# Patient Record
Sex: FEMALE | Race: WHITE | NOT HISPANIC OR LATINO | Employment: OTHER | URBAN - METROPOLITAN AREA
[De-identification: names, ages, dates, MRNs, and addresses within clinical notes are randomized per-mention and may not be internally consistent; named-entity substitution may affect disease eponyms.]

---

## 2022-12-17 ENCOUNTER — HOSPITAL ENCOUNTER (EMERGENCY)
Facility: HOSPITAL | Age: 62
Discharge: HOME | End: 2022-12-17
Attending: EMERGENCY MEDICINE | Admitting: EMERGENCY MEDICINE

## 2022-12-17 VITALS
DIASTOLIC BLOOD PRESSURE: 82 MMHG | TEMPERATURE: 97.8 F | RESPIRATION RATE: 16 BRPM | SYSTOLIC BLOOD PRESSURE: 148 MMHG | WEIGHT: 230 LBS | OXYGEN SATURATION: 100 % | HEART RATE: 68 BPM | HEIGHT: 63 IN | BODY MASS INDEX: 40.75 KG/M2

## 2022-12-17 DIAGNOSIS — E16.2 HYPOGLYCEMIA: Primary | ICD-10-CM

## 2022-12-17 LAB
ANION GAP SERPL CALCULATED.3IONS-SCNC: 14 MMOL/L (ref 5–15)
BACTERIA UR QL AUTO: ABNORMAL /HPF
BASOPHILS # BLD AUTO: 0 10*3/MM3 (ref 0–0.2)
BASOPHILS NFR BLD AUTO: 0.3 % (ref 0–1.5)
BILIRUB UR QL STRIP: NEGATIVE
BUN SERPL-MCNC: 18 MG/DL (ref 8–23)
BUN/CREAT SERPL: 27.7 (ref 7–25)
CALCIUM SPEC-SCNC: 9.3 MG/DL (ref 8.6–10.5)
CHLORIDE SERPL-SCNC: 101 MMOL/L (ref 98–107)
CLARITY UR: ABNORMAL
CO2 SERPL-SCNC: 23 MMOL/L (ref 22–29)
COLOR UR: YELLOW
CREAT SERPL-MCNC: 0.65 MG/DL (ref 0.57–1)
DEPRECATED RDW RBC AUTO: 44.2 FL (ref 37–54)
EGFRCR SERPLBLD CKD-EPI 2021: 98.5 ML/MIN/1.73
EOSINOPHIL # BLD AUTO: 0 10*3/MM3 (ref 0–0.4)
EOSINOPHIL NFR BLD AUTO: 0.1 % (ref 0.3–6.2)
ERYTHROCYTE [DISTWIDTH] IN BLOOD BY AUTOMATED COUNT: 15.1 % (ref 12.3–15.4)
GLUCOSE BLDC GLUCOMTR-MCNC: 134 MG/DL (ref 70–105)
GLUCOSE BLDC GLUCOMTR-MCNC: 202 MG/DL (ref 70–105)
GLUCOSE SERPL-MCNC: 168 MG/DL (ref 65–99)
GLUCOSE UR STRIP-MCNC: ABNORMAL MG/DL
HCT VFR BLD AUTO: 38.1 % (ref 34–46.6)
HGB BLD-MCNC: 11.7 G/DL (ref 12–15.9)
HGB UR QL STRIP.AUTO: ABNORMAL
HYALINE CASTS UR QL AUTO: ABNORMAL /LPF
KETONES UR QL STRIP: ABNORMAL
LEUKOCYTE ESTERASE UR QL STRIP.AUTO: NEGATIVE
LYMPHOCYTES # BLD AUTO: 1 10*3/MM3 (ref 0.7–3.1)
LYMPHOCYTES NFR BLD AUTO: 8.4 % (ref 19.6–45.3)
MCH RBC QN AUTO: 25.8 PG (ref 26.6–33)
MCHC RBC AUTO-ENTMCNC: 30.8 G/DL (ref 31.5–35.7)
MCV RBC AUTO: 83.7 FL (ref 79–97)
MONOCYTES # BLD AUTO: 0.2 10*3/MM3 (ref 0.1–0.9)
MONOCYTES NFR BLD AUTO: 1.8 % (ref 5–12)
NEUTROPHILS NFR BLD AUTO: 10.8 10*3/MM3 (ref 1.7–7)
NEUTROPHILS NFR BLD AUTO: 89.4 % (ref 42.7–76)
NITRITE UR QL STRIP: NEGATIVE
NRBC BLD AUTO-RTO: 0 /100 WBC (ref 0–0.2)
PH UR STRIP.AUTO: 5.5 [PH] (ref 5–8)
PLATELET # BLD AUTO: 283 10*3/MM3 (ref 140–450)
PMV BLD AUTO: 8.1 FL (ref 6–12)
POTASSIUM SERPL-SCNC: 4 MMOL/L (ref 3.5–5.2)
PROT UR QL STRIP: ABNORMAL
RBC # BLD AUTO: 4.55 10*6/MM3 (ref 3.77–5.28)
RBC # UR STRIP: ABNORMAL /HPF
REF LAB TEST METHOD: ABNORMAL
SODIUM SERPL-SCNC: 138 MMOL/L (ref 136–145)
SP GR UR STRIP: 1.03 (ref 1–1.03)
SQUAMOUS #/AREA URNS HPF: ABNORMAL /HPF
UROBILINOGEN UR QL STRIP: ABNORMAL
WBC # UR STRIP: ABNORMAL /HPF
WBC NRBC COR # BLD: 12.1 10*3/MM3 (ref 3.4–10.8)

## 2022-12-17 PROCEDURE — 80048 BASIC METABOLIC PNL TOTAL CA: CPT | Performed by: EMERGENCY MEDICINE

## 2022-12-17 PROCEDURE — 81001 URINALYSIS AUTO W/SCOPE: CPT | Performed by: EMERGENCY MEDICINE

## 2022-12-17 PROCEDURE — 81003 URINALYSIS AUTO W/O SCOPE: CPT | Performed by: EMERGENCY MEDICINE

## 2022-12-17 PROCEDURE — 99284 EMERGENCY DEPT VISIT MOD MDM: CPT

## 2022-12-17 PROCEDURE — 87086 URINE CULTURE/COLONY COUNT: CPT | Performed by: EMERGENCY MEDICINE

## 2022-12-17 PROCEDURE — 85025 COMPLETE CBC W/AUTO DIFF WBC: CPT | Performed by: EMERGENCY MEDICINE

## 2022-12-17 PROCEDURE — 82962 GLUCOSE BLOOD TEST: CPT

## 2022-12-17 PROCEDURE — 36415 COLL VENOUS BLD VENIPUNCTURE: CPT

## 2022-12-17 NOTE — DISCHARGE INSTRUCTIONS
Monitor sugar closely more frequently tonight.  Adjust your insulin accordingly.  Follow-up your doctor on Monday.  Return for reoccurrence of symptoms fever vomiting or any other new or worse problems or concerns return immediately to the ER

## 2022-12-17 NOTE — ED PROVIDER NOTES
Subjective   History of Present Illness  Chief complaint low blood sugar    History of present illness this is a 62-year-old female has been diabetic on long-acting and short acting insulin who states that she took her insulin but it bottomed her out.  She did not get to eat as she normally does.  She started to feel sweaty and weak and had some right-sided weakness EMS was called they found her sugar to be 40 they gave her half amp D50 and her symptoms went away and she improved dramatically.  She was brought in for evaluation she denies any headache chest pain fever chills sweats no recent injury or illness no recent change in medications.  States she feels completely normal now and wants to go home.  Patient states that she just missed does the amount of insulin to give her self on this fast acting insulin.  Denies any other complaints or associated symptoms.  This happened within the last couple hours symptoms are moderate to severe now resolved improved with D50 and triggered by insulin injection.  No other complaints or associated symptoms at this time no recent change in medications        Review of Systems   Constitutional: Negative for chills and fever.   HENT: Negative for congestion and sinus pressure.    Eyes: Negative for photophobia and visual disturbance.   Respiratory: Negative for chest tightness and shortness of breath.    Gastrointestinal: Negative for abdominal pain, blood in stool and vomiting.   Genitourinary: Negative for difficulty urinating and dysuria.   Musculoskeletal: Negative for back pain and neck pain.   Skin: Negative for rash and wound.   Neurological: Negative for dizziness, weakness and light-headedness.   Psychiatric/Behavioral: Negative for agitation and confusion.       No past medical history on file.  Diabetic  Allergies   Allergen Reactions   • Clindamycin/Lincomycin Hives   • Contrast Dye Hives   • Macrobid [Nitrofurantoin] Hives   • Penicillins Hives   • Sulfa Antibiotics  Hives       No past surgical history on file.    No family history on file.    Social History     Socioeconomic History   • Marital status:    Social history lives at home no alcohol or drug  Prior to Admission medications    Not on File         Patient takes long-acting and short acting insulin  Objective   Physical Exam  Constitutional 62-year-old female awake alert and she is in no acute distress at this time.  Sugar 134 patient temperature 97.8 sats 100% on room air blood pressure 127/60 heart rate 68 respirations 16.  HEENT extraocular muscles are intact pupils equal round reactive there is no sign of trauma no photophobia mouth is clear.  Neck is supple no adenopathy no JVD no bruits no meningeal signs.  Lungs clear no retractions.  Back no CVA tenderness.  Heart regular without murmur.  Abdomen soft nontender good bowel sounds no peritoneal findings or pulsatile masses.  Extremities pulses equal upper lower extremities no edema no cords no Homans' sign no evidence of DVT.  Skin is warm and dry without cellulitic changes.  Neurologic awake alert orientated x4 no facial asymmetry normal speech no drift the arms legs focal weakness she walks without ataxia no focal finding  Procedures           ED Course  ED Course as of 12/17/22 2143   Sat Dec 17, 2022   1834 CBC & Differential(!) [VERNA]      ED Course User Index  [VERNA] Jonny Goldberg MD            Results for orders placed or performed during the hospital encounter of 12/17/22   Basic Metabolic Panel    Specimen: Blood   Result Value Ref Range    Glucose 168 (H) 65 - 99 mg/dL    BUN 18 8 - 23 mg/dL    Creatinine 0.65 0.57 - 1.00 mg/dL    Sodium 138 136 - 145 mmol/L    Potassium 4.0 3.5 - 5.2 mmol/L    Chloride 101 98 - 107 mmol/L    CO2 23.0 22.0 - 29.0 mmol/L    Calcium 9.3 8.6 - 10.5 mg/dL    BUN/Creatinine Ratio 27.7 (H) 7.0 - 25.0    Anion Gap 14.0 5.0 - 15.0 mmol/L    eGFR 98.5 >60.0 mL/min/1.73   Urinalysis With Culture If Indicated - Urine, Clean  Catch    Specimen: Urine, Clean Catch   Result Value Ref Range    Color, UA Yellow Yellow, Straw    Appearance, UA Cloudy (A) Clear    pH, UA 5.5 5.0 - 8.0    Specific Gravity, UA 1.027 1.005 - 1.030    Glucose, UA >=1000 mg/dL (3+) (A) Negative    Ketones, UA 15 mg/dL (1+) (A) Negative    Bilirubin, UA Negative Negative    Blood, UA Moderate (2+) (A) Negative    Protein, UA 30 mg/dL (1+) (A) Negative    Leuk Esterase, UA Negative Negative    Nitrite, UA Negative Negative    Urobilinogen, UA 0.2 E.U./dL 0.2 - 1.0 E.U./dL   CBC Auto Differential    Specimen: Blood   Result Value Ref Range    WBC 12.10 (H) 3.40 - 10.80 10*3/mm3    RBC 4.55 3.77 - 5.28 10*6/mm3    Hemoglobin 11.7 (L) 12.0 - 15.9 g/dL    Hematocrit 38.1 34.0 - 46.6 %    MCV 83.7 79.0 - 97.0 fL    MCH 25.8 (L) 26.6 - 33.0 pg    MCHC 30.8 (L) 31.5 - 35.7 g/dL    RDW 15.1 12.3 - 15.4 %    RDW-SD 44.2 37.0 - 54.0 fl    MPV 8.1 6.0 - 12.0 fL    Platelets 283 140 - 450 10*3/mm3    Neutrophil % 89.4 (H) 42.7 - 76.0 %    Lymphocyte % 8.4 (L) 19.6 - 45.3 %    Monocyte % 1.8 (L) 5.0 - 12.0 %    Eosinophil % 0.1 (L) 0.3 - 6.2 %    Basophil % 0.3 0.0 - 1.5 %    Neutrophils, Absolute 10.80 (H) 1.70 - 7.00 10*3/mm3    Lymphocytes, Absolute 1.00 0.70 - 3.10 10*3/mm3    Monocytes, Absolute 0.20 0.10 - 0.90 10*3/mm3    Eosinophils, Absolute 0.00 0.00 - 0.40 10*3/mm3    Basophils, Absolute 0.00 0.00 - 0.20 10*3/mm3    nRBC 0.0 0.0 - 0.2 /100 WBC   Urinalysis, Microscopic Only - Urine, Clean Catch    Specimen: Urine, Clean Catch   Result Value Ref Range    RBC, UA 31-50 (A) None Seen /HPF    WBC, UA 6-12 (A) None Seen /HPF    Bacteria, UA Trace (A) None Seen /HPF    Squamous Epithelial Cells, UA 7-12 (A) None Seen, 0-2 /HPF    Hyaline Casts, UA 0-2 None Seen /LPF    Methodology Automated Microscopy    POC Glucose Once    Specimen: Blood   Result Value Ref Range    Glucose 134 (H) 70 - 105 mg/dL   POC Glucose Once    Specimen: Blood   Result Value Ref Range    Glucose 202  (H) 70 - 105 mg/dL     No radiology results for the last day  Medications - No data to display                                    MDM  Number of Diagnoses or Management Options  Hypoglycemia: new and requires workup  Diagnosis management comments: Decision making.  Patient had prehospital IVs placed on the monitor reviewed by me showed a sinus rhythm Accu-Chek was 132 chemistries unremarkable blood sugar 168 urine was negative white blood cell count was 12.  Hemoglobin was 11.7.  Patient was given a sandwich to eat.  She was observed in ER for several hours blood sugar remained stable never was hypoglycemic again.  She had blood sugar 202 on recheck.  Patient desires to go home.  Her daughter is here to pick her up.  She remained awake and alert I do not see evidence of an acute stroke there is no focal findings.  She had blood sugar 40 she improved with D50 and she feels well at this point I will see him as an obvious infection anywhere no cellulitic changes no red hot swollen joints.  He has no chest pain or shortness of breath her lungs are clear vital signs look good.  Not complete list of all possibilities that can cause hypoglycemia.  We talked about the findings.  She will monitor her sugar closely 90 to adjust her insulin accordingly.  She will follow-up with her doctor on Monday for recheck and return if she has any further problems stable otherwise unremarkable ER course.  All labs obtained reviewed by me       Amount and/or Complexity of Data Reviewed  Clinical lab tests: reviewed    Risk of Complications, Morbidity, and/or Mortality  Presenting problems: moderate  Diagnostic procedures: moderate  Management options: moderate    Patient Progress  Patient progress: stable      Final diagnoses:   Hypoglycemia       ED Disposition  ED Disposition     ED Disposition   Discharge    Condition   Stable    Comment   --             Rajat Griffin MD  [facility] [address]  [address]  [phone]    In 2 days           Medication List      No changes were made to your prescriptions during this visit.          Jonny Goldberg MD  12/17/22 2861

## 2022-12-18 LAB — BACTERIA SPEC AEROBE CULT: NORMAL

## 2024-11-27 ENCOUNTER — TELEPHONE (OUTPATIENT)
Dept: ENDOCRINOLOGY | Age: 64
End: 2024-11-27
Payer: COMMERCIAL

## 2024-11-27 NOTE — TELEPHONE ENCOUNTER
-LEFT VOICEMAIL FOR SOONER APPT 11/25  -LEFT VOICEMAIL FOR SOONER APPT 11/26  -LEFT VOICEMAIL FOR SOONER APPT 11/27     REMOVING PATIENT FROM WAIT LIST

## 2024-12-04 ENCOUNTER — OFFICE VISIT (OUTPATIENT)
Dept: ENDOCRINOLOGY | Age: 64
End: 2024-12-04
Payer: MEDICARE

## 2024-12-04 ENCOUNTER — TELEPHONE (OUTPATIENT)
Dept: ENDOCRINOLOGY | Age: 64
End: 2024-12-04

## 2024-12-04 VITALS
TEMPERATURE: 98 F | WEIGHT: 251.8 LBS | DIASTOLIC BLOOD PRESSURE: 80 MMHG | SYSTOLIC BLOOD PRESSURE: 120 MMHG | BODY MASS INDEX: 44.6 KG/M2 | HEART RATE: 54 BPM | OXYGEN SATURATION: 100 %

## 2024-12-04 DIAGNOSIS — E10.9 TYPE 1 DIABETES MELLITUS WITHOUT COMPLICATION: Primary | ICD-10-CM

## 2024-12-04 RX ORDER — ALLOPURINOL 300 MG/1
300 TABLET ORAL DAILY
COMMUNITY

## 2024-12-04 RX ORDER — ACYCLOVIR 400 MG/1
1 TABLET ORAL
COMMUNITY
Start: 2024-10-18 | End: 2024-12-04 | Stop reason: SDUPTHER

## 2024-12-04 RX ORDER — HUMAN INSULIN 100 [IU]/ML
5-15 INJECTION, SOLUTION SUBCUTANEOUS
COMMUNITY
End: 2024-12-04

## 2024-12-04 RX ORDER — ZINC GLUCONATE 50 MG
50 TABLET ORAL DAILY
COMMUNITY

## 2024-12-04 RX ORDER — GLUCAGON INJECTION, SOLUTION 1 MG/.2ML
0.2 INJECTION, SOLUTION SUBCUTANEOUS
COMMUNITY
Start: 2024-07-17

## 2024-12-04 RX ORDER — MAGNESIUM 200 MG
500 TABLET ORAL
COMMUNITY

## 2024-12-04 RX ORDER — HYDROCODONE BITARTRATE AND ACETAMINOPHEN 5; 325 MG/1; MG/1
1 TABLET ORAL EVERY 6 HOURS PRN
COMMUNITY
Start: 2024-11-27

## 2024-12-04 RX ORDER — INSULIN GLARGINE 100 [IU]/ML
40 INJECTION, SOLUTION SUBCUTANEOUS NIGHTLY
Qty: 10 ML | Refills: 3 | Status: SHIPPED | OUTPATIENT
Start: 2024-12-04

## 2024-12-04 RX ORDER — PROPRANOLOL HYDROCHLORIDE 60 MG/1
CAPSULE, EXTENDED RELEASE ORAL
COMMUNITY
Start: 2024-12-02

## 2024-12-04 RX ORDER — ATORVASTATIN CALCIUM 40 MG/1
40 TABLET, FILM COATED ORAL DAILY
COMMUNITY

## 2024-12-04 RX ORDER — HUMAN INSULIN 100 [IU]/ML
22-25 INJECTION, SUSPENSION SUBCUTANEOUS
COMMUNITY
End: 2024-12-04

## 2024-12-04 RX ORDER — CITALOPRAM HYDROBROMIDE 40 MG/1
40 TABLET ORAL DAILY
COMMUNITY

## 2024-12-04 RX ORDER — ACYCLOVIR 400 MG/1
1 TABLET ORAL
Qty: 3 EACH | Refills: 11 | Status: SHIPPED | OUTPATIENT
Start: 2024-12-04

## 2024-12-04 NOTE — PROGRESS NOTES
Referring provider: Rajat Griffin,*     Chief complaint/Reason for consult: T1DM    HPI:   - 64 year old female here for management of diabetes mellitus type 1  - Has had diabetes for years  - Complications include  - No known complications to date  - Is currently taking Novolin N 22-27 units bid and Lispro 5-15 meals before meals  - Wears a DexCom G7    The following portions of the patient's history were reviewed and updated as appropriate: allergies, current medications, past family history, past medical history, past social history, past surgical history, and problem list.    Objective     Vitals:    12/04/24 1156   BP: 120/80   Pulse: 54   Temp: 98 °F (36.7 °C)   SpO2: 100%        Physical Exam  Vitals reviewed.   Constitutional:       Appearance: Normal appearance. She is obese.   Eyes:      General: No scleral icterus.  Pulmonary:      Effort: Pulmonary effort is normal. No respiratory distress.   Neurological:      Mental Status: She is alert.      Gait: Gait normal.   Psychiatric:         Mood and Affect: Mood normal.         Behavior: Behavior normal.         Thought Content: Thought content normal.         Judgment: Judgment normal.     CGM interpretation  Dates reviewed:  11/21-12/4/24  Data:  Avg of 225, 40% very high, 22% high, 36% in range, 2% low, <1% low  Interpretation:  Hyperglycemia especially during the day    Assessment & Plan   T1DM, uncontrolled due to hyperglycemia  - Stop Novolin N  - Increase Lispro by 2 units with meals (should take 7-17 units before meals)  - Start Lantus 40 units daily    - Return to clinic in 2 months

## 2024-12-04 NOTE — TELEPHONE ENCOUNTER
Caller: Sandrita Bajwa    Relationship: Self    Best call back number:   Telephone Information:   Mobile [phone]         What is the best time to reach you: ANYTIME    Who are you requesting to speak with (clinical staff, provider,  specific staff member): CLINICAL STAFF / PROVIDER    What was the call regarding: PT CALLED STATING SHE HAD AND APPOINTMENT 12/04/2024. PT WONDERING HOW MANY UNITS SHOULD SHE TAKE FOR HER LISPRO MEDICATION. PLEASE ADVISE.

## 2024-12-04 NOTE — TELEPHONE ENCOUNTER
PATIENTS SISTER CALLING BACK. PATIENT IS VERY HANDY WITH COMPUTER AND HAVING ISSUES WITH PHONE. IF YOU COULD CALL HER SISTER BACK. BLUE [employer] [phone]

## 2024-12-05 ENCOUNTER — TELEPHONE (OUTPATIENT)
Dept: ENDOCRINOLOGY | Age: 64
End: 2024-12-05

## 2024-12-05 NOTE — TELEPHONE ENCOUNTER
Called and spoke with pt's sister, Imani. Informed Imani that pt is to increase Lispro by 2 units with meals. Imani voiced understanding and had no further questions or concerns.

## 2024-12-05 NOTE — TELEPHONE ENCOUNTER
Called and spoke with pt. Informed pt to increase Lispro by 2 units with meals. Pt voiced understanding and had no further questions or concerns.

## 2025-02-04 ENCOUNTER — OFFICE VISIT (OUTPATIENT)
Dept: ENDOCRINOLOGY | Age: 65
End: 2025-02-04
Payer: MEDICARE

## 2025-02-04 VITALS
OXYGEN SATURATION: 98 % | SYSTOLIC BLOOD PRESSURE: 124 MMHG | DIASTOLIC BLOOD PRESSURE: 80 MMHG | BODY MASS INDEX: 46.33 KG/M2 | HEIGHT: 62 IN | TEMPERATURE: 97.7 F | HEART RATE: 63 BPM | WEIGHT: 251.8 LBS

## 2025-02-04 DIAGNOSIS — E10.9 TYPE 1 DIABETES MELLITUS WITHOUT COMPLICATION: ICD-10-CM

## 2025-02-04 RX ORDER — INSULIN GLARGINE 100 [IU]/ML
40 INJECTION, SOLUTION SUBCUTANEOUS NIGHTLY
Qty: 10 ML | Refills: 3 | Status: SHIPPED | OUTPATIENT
Start: 2025-02-04

## 2025-02-04 RX ORDER — INSULIN LISPRO 100 [IU]/ML
10-20 INJECTION, SOLUTION INTRAVENOUS; SUBCUTANEOUS
Qty: 60 ML | Refills: 0 | Status: SHIPPED | OUTPATIENT
Start: 2025-02-04

## 2025-02-04 RX ORDER — LOSARTAN POTASSIUM 100 MG/1
1 TABLET ORAL DAILY
COMMUNITY
Start: 2024-12-13

## 2025-02-04 NOTE — PROGRESS NOTES
Chief complaint/Reason for consult:  T1DM    HPI:   - 64 year old female here for management of diabetes mellitus type 1  - Has had diabetes for over 10 years  - No known complications to date  - Is currently taking Lantus 40 units daily and Lispro 7-17 meals before meals  - Wears a DexCom G7    The following portions of the patient's history were reviewed and updated as appropriate: allergies, current medications, past family history, past medical history, past social history, past surgical history, and problem list.      Objective     Vitals:    02/04/25 1149   BP: 124/80   Pulse: 63   Temp: 97.7 °F (36.5 °C)   SpO2: 98%        Physical Exam  Vitals reviewed.   Constitutional:       Appearance: Normal appearance.   HENT:      Head: Normocephalic and atraumatic.   Eyes:      General: No scleral icterus.  Pulmonary:      Effort: Pulmonary effort is normal.   Neurological:      Mental Status: She is alert.      Gait: Gait normal.   Psychiatric:         Mood and Affect: Mood normal.         Behavior: Behavior normal.         Thought Content: Thought content normal.         Judgment: Judgment normal.     CGM interpretation  Dates reviewed:  1/22-2/4/25  Data:  Avg of 247, 50% very high, 27% high, 20% in range, 1% low, 2% very low  Interpretation:  Postprandial hyperglycemia      Assessment & Plan   T1DM, uncontrolled due to hyperglycemia  - Increase Lispro to 10-20 units before meals  - Start Lantus 40 units daily     - Return to clinic in 2 months

## 2025-02-07 ENCOUNTER — TELEPHONE (OUTPATIENT)
Dept: ENDOCRINOLOGY | Age: 65
End: 2025-02-07
Payer: MEDICARE

## 2025-02-07 NOTE — TELEPHONE ENCOUNTER
Called and spoke with pt. Informed pt that she should go ahead and put on last sensor. Informed pt she would need to contact [employer] to get replacement sensors sent out. Pt stated she has already contacted [employer] and they are sending her replacements out. Pt had no further questions or concerns.

## 2025-05-04 DIAGNOSIS — E10.9 TYPE 1 DIABETES MELLITUS WITHOUT COMPLICATION: ICD-10-CM

## 2025-05-05 RX ORDER — INSULIN LISPRO 100 [IU]/ML
INJECTION, SOLUTION INTRAVENOUS; SUBCUTANEOUS
Qty: 60 ML | Refills: 0 | Status: SHIPPED | OUTPATIENT
Start: 2025-05-05

## 2025-05-05 NOTE — TELEPHONE ENCOUNTER
Rx Refill Note  Requested Prescriptions     Pending Prescriptions Disp Refills    Insulin Lispro (humaLOG) 100 UNIT/ML injection [Pharmacy Med Name: Insulin Lispro Injection Solution 100 UNIT/ML] 60 mL 0     Sig: INJECT 10 TO 20 UNITS UNDER THE SKIN INTO THE APPROPRIATE AREA AS DIRECTED 3 TIMES A DAY BEFORE MEALS.      Last office visit with prescribing clinician: 2/4/2025   Last telemedicine visit with prescribing clinician: Visit date not found   Next office visit with prescribing clinician: 5/6/2025                         Would you like a call back once the refill request has been completed: [] Yes [] No    If the office needs to give you a call back, can they leave a voicemail: [] Yes [] No    Ana Gunn MA  05/05/25, 07:45 EDT

## 2025-05-06 ENCOUNTER — OFFICE VISIT (OUTPATIENT)
Dept: ENDOCRINOLOGY | Age: 65
End: 2025-05-06
Payer: MEDICARE

## 2025-05-06 VITALS
OXYGEN SATURATION: 100 % | WEIGHT: 256.6 LBS | HEIGHT: 62 IN | BODY MASS INDEX: 47.22 KG/M2 | DIASTOLIC BLOOD PRESSURE: 84 MMHG | SYSTOLIC BLOOD PRESSURE: 132 MMHG | HEART RATE: 60 BPM

## 2025-05-06 DIAGNOSIS — E10.40 TYPE 1 DIABETES MELLITUS WITH DIABETIC NEUROPATHY: Primary | ICD-10-CM

## 2025-05-06 NOTE — PROGRESS NOTES
Chief complaint/Reason for consult: T1DM    HPI:   - 64 year old female here for management of diabetes mellitus type 1  - Was last seen in 2/2025  - Has had diabetes for over 10 years  - She does have peripheral neuropathy  - Is currently taking Lantus 40 units daily and Lispro 15-20 meals before meals  - Wears a DexCom G7    The following portions of the patient's history were reviewed and updated as appropriate: allergies, current medications, past family history, past medical history, past social history, past surgical history, and problem list.      Objective     Vitals:    05/06/25 1115   BP: 132/84   Pulse: 60   SpO2: 100%        Physical Exam  Vitals reviewed.   Constitutional:       Appearance: Normal appearance.   HENT:      Head: Normocephalic and atraumatic.   Eyes:      General: No scleral icterus.  Pulmonary:      Effort: Pulmonary effort is normal. No respiratory distress.   Neurological:      Mental Status: She is alert.   Psychiatric:         Mood and Affect: Mood normal.         Behavior: Behavior normal.         Thought Content: Thought content normal.         Judgment: Judgment normal.     CGM interpretation  Dates reviewed:  4/23-5/6/25  Data:  Avg of 233, 37% very high, 30% high, 31% in range, 2% low  Interpretation:  Postprandial hyperglycemia    Assessment & Plan   T1DM, uncontrolled due to hyperglycemia, complicated by peripheral neuropathy  - She does not want to try an insulin pump  - Increase Lispro to 17-22 units before meals  - Start Lantus 40 units daily     - Return to clinic in 3 months

## 2025-06-22 DIAGNOSIS — E10.9 TYPE 1 DIABETES MELLITUS WITHOUT COMPLICATION: ICD-10-CM

## 2025-06-23 RX ORDER — INSULIN GLARGINE 100 [IU]/ML
40 INJECTION, SOLUTION SUBCUTANEOUS DAILY
Qty: 36 ML | Refills: 0 | Status: SHIPPED | OUTPATIENT
Start: 2025-06-23

## 2025-06-23 NOTE — TELEPHONE ENCOUNTER
Rx Refill Note  Requested Prescriptions     Pending Prescriptions Disp Refills    Lantus 100 UNIT/ML injection [Pharmacy Med Name: Lantus Subcutaneous Solution 100 UNIT/ML] 10 mL 0     Sig: Inject 40 Units under the skin into the appropriate area as directed Every Night.      Last office visit with prescribing clinician: 5/6/2025   Last telemedicine visit with prescribing clinician: Visit date not found   Next office visit with prescribing clinician: 8/20/2025                         Would you like a call back once the refill request has been completed: [] Yes [] No    If the office needs to give you a call back, can they leave a voicemail: [] Yes [] No  Ana Gunn MA  6/23/2025  07:51 EDT

## 2025-08-20 ENCOUNTER — OFFICE VISIT (OUTPATIENT)
Dept: ENDOCRINOLOGY | Age: 65
End: 2025-08-20
Payer: MEDICARE

## 2025-08-20 VITALS
BODY MASS INDEX: 46.26 KG/M2 | SYSTOLIC BLOOD PRESSURE: 128 MMHG | HEIGHT: 62 IN | OXYGEN SATURATION: 99 % | HEART RATE: 61 BPM | WEIGHT: 251.4 LBS | DIASTOLIC BLOOD PRESSURE: 86 MMHG

## 2025-08-20 DIAGNOSIS — E10.40 TYPE 1 DIABETES MELLITUS WITH DIABETIC NEUROPATHY: Primary | ICD-10-CM

## 2025-08-20 PROCEDURE — 95251 CONT GLUC MNTR ANALYSIS I&R: CPT | Performed by: INTERNAL MEDICINE

## 2025-08-20 PROCEDURE — 1160F RVW MEDS BY RX/DR IN RCRD: CPT | Performed by: INTERNAL MEDICINE

## 2025-08-20 PROCEDURE — 99214 OFFICE O/P EST MOD 30 MIN: CPT | Performed by: INTERNAL MEDICINE

## 2025-08-20 PROCEDURE — 1159F MED LIST DOCD IN RCRD: CPT | Performed by: INTERNAL MEDICINE
